# Patient Record
Sex: FEMALE | Race: WHITE | Employment: STUDENT | URBAN - METROPOLITAN AREA
[De-identification: names, ages, dates, MRNs, and addresses within clinical notes are randomized per-mention and may not be internally consistent; named-entity substitution may affect disease eponyms.]

---
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
## 2021-09-03 ENCOUNTER — APPOINTMENT (OUTPATIENT)
Dept: GENERAL RADIOLOGY | Age: 21
End: 2021-09-03
Attending: EMERGENCY MEDICINE
Payer: COMMERCIAL

## 2021-09-03 ENCOUNTER — HOSPITAL ENCOUNTER (EMERGENCY)
Age: 21
Discharge: HOME | End: 2021-09-03
Attending: EMERGENCY MEDICINE
Payer: COMMERCIAL

## 2021-09-03 VITALS
SYSTOLIC BLOOD PRESSURE: 125 MMHG | HEART RATE: 90 BPM | DIASTOLIC BLOOD PRESSURE: 86 MMHG | TEMPERATURE: 98 F | RESPIRATION RATE: 17 BRPM | OXYGEN SATURATION: 98 %

## 2021-09-03 DIAGNOSIS — M54.9 MUSCULOSKELETAL BACK PAIN: ICD-10-CM
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
## 2021-09-03 DIAGNOSIS — S81.811S LEG LACERATION, RIGHT, SEQUELA: ICD-10-CM

## 2021-09-03 PROCEDURE — 74011000250 HC RX REV CODE- 250: Performed by: EMERGENCY MEDICINE

## 2021-09-03 PROCEDURE — 99283 EMERGENCY DEPT VISIT LOW MDM: CPT

## 2021-09-03 PROCEDURE — 71100 X-RAY EXAM RIBS UNI 2 VIEWS: CPT

## 2021-09-03 PROCEDURE — 74011250637 HC RX REV CODE- 250/637: Performed by: EMERGENCY MEDICINE

## 2021-09-03 PROCEDURE — 71046 X-RAY EXAM CHEST 2 VIEWS: CPT

## 2021-09-03 RX ORDER — ACETAMINOPHEN 500 MG
1000 TABLET ORAL ONCE
Status: DISCONTINUED | OUTPATIENT
Start: 2021-09-03 | End: 2021-09-03

## 2021-09-03 RX ORDER — IBUPROFEN 600 MG/1
600 TABLET ORAL
Status: COMPLETED | OUTPATIENT
Start: 2021-09-03 | End: 2021-09-03

## 2021-09-03 RX ORDER — BACITRACIN 500 UNIT/G
1 PACKET (EA) TOPICAL
Status: COMPLETED | OUTPATIENT
Start: 2021-09-03 | End: 2021-09-03

## 2021-09-03 RX ADMIN — IBUPROFEN 600 MG: 600 TABLET, FILM COATED ORAL at 19:10

## 2021-09-03 RX ADMIN — BACITRACIN 1 PACKET: 500 OINTMENT TOPICAL at 19:43
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
## 2021-09-17 ENCOUNTER — OFFICE VISIT (OUTPATIENT)
Dept: INTERNAL MEDICINE CLINIC | Age: 21
End: 2021-09-17
Payer: COMMERCIAL

## 2021-09-17 ENCOUNTER — TELEPHONE (OUTPATIENT)
Dept: INTERNAL MEDICINE CLINIC | Age: 21
End: 2021-09-17

## 2021-09-17 VITALS
WEIGHT: 122.25 LBS | BODY MASS INDEX: 22.5 KG/M2 | DIASTOLIC BLOOD PRESSURE: 63 MMHG | RESPIRATION RATE: 20 BRPM | SYSTOLIC BLOOD PRESSURE: 97 MMHG | HEART RATE: 80 BPM | OXYGEN SATURATION: 98 % | HEIGHT: 62 IN | TEMPERATURE: 98.3 F

## 2021-09-17 DIAGNOSIS — T14.8XXA WOUND INFECTION, POSTTRAUMATIC: ICD-10-CM

## 2021-09-17 DIAGNOSIS — D27.0 TERATOMA OF OVARY, RIGHT: ICD-10-CM

## 2021-09-17 DIAGNOSIS — S89.91XS RIGHT LEG INJURY, SEQUELA: Primary | ICD-10-CM

## 2021-09-17 DIAGNOSIS — L08.9 WOUND INFECTION, POSTTRAUMATIC: ICD-10-CM

## 2021-09-17 PROCEDURE — 99214 OFFICE O/P EST MOD 30 MIN: CPT | Performed by: INTERNAL MEDICINE

## 2021-09-17 RX ORDER — SILVER SULFADIAZINE 10 G/1000G
CREAM TOPICAL
COMMUNITY
Start: 2021-09-10 | End: 2021-09-29 | Stop reason: SDUPTHER

## 2021-09-17 RX ORDER — SULFAMETHOXAZOLE AND TRIMETHOPRIM 800; 160 MG/1; MG/1
1 TABLET ORAL 2 TIMES DAILY
Qty: 20 TABLET | Refills: 0 | Status: SHIPPED | OUTPATIENT
Start: 2021-09-17 | End: 2021-09-27

## 2021-09-17 RX ORDER — OXYCODONE AND ACETAMINOPHEN 5; 325 MG/1; MG/1
TABLET ORAL
COMMUNITY
Start: 2021-09-08 | End: 2021-11-12

## 2021-09-17 RX ORDER — CEPHALEXIN 500 MG/1
500 CAPSULE ORAL 3 TIMES DAILY
COMMUNITY
Start: 2021-09-08 | End: 2021-09-17 | Stop reason: ALTCHOICE

## 2021-09-17 NOTE — TELEPHONE ENCOUNTER
Please call wound care center at [facility] and get pt an appt for next week.
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
## 2021-09-17 NOTE — LETTER
NOTIFICATION RETURN TO  SCHOOL    9/17/2021 12:32 PM    Ms. [address] [address]      To Whom It May Concern:    Nuha Angel is currently under the care of [facility]. She will return to school on: 9/27/21    If there are questions or concerns please have the patient contact our office.         Sincerely,      Pavan Santana MD

## 2021-09-17 NOTE — TELEPHONE ENCOUNTER
Called  wound care center at [facility] [phone], went to . Requested a call back to schedule a pt on behalf of Dr. Judy Mcdaniel.

## 2021-09-18 ENCOUNTER — HOSPITAL ENCOUNTER (EMERGENCY)
Age: 21
Discharge: HOME | End: 2021-09-18
Attending: EMERGENCY MEDICINE
Payer: COMMERCIAL

## 2021-09-18 ENCOUNTER — APPOINTMENT (OUTPATIENT)
Dept: ULTRASOUND IMAGING | Age: 21
End: 2021-09-18
Attending: EMERGENCY MEDICINE
Payer: COMMERCIAL

## 2021-09-18 VITALS
TEMPERATURE: 98.2 F | OXYGEN SATURATION: 96 % | BODY MASS INDEX: 22.45 KG/M2 | SYSTOLIC BLOOD PRESSURE: 102 MMHG | HEART RATE: 91 BPM | DIASTOLIC BLOOD PRESSURE: 69 MMHG | RESPIRATION RATE: 15 BRPM | WEIGHT: 122 LBS

## 2021-09-18 DIAGNOSIS — S81.801A WOUND OF RIGHT LOWER EXTREMITY, INITIAL ENCOUNTER: Primary | ICD-10-CM

## 2021-09-18 LAB
ANION GAP SERPL CALC-SCNC: 11 MMOL/L (ref 5–15)
BASOPHILS # BLD: 0 K/UL (ref 0–0.1)
BASOPHILS NFR BLD: 0 % (ref 0–1)
BUN SERPL-MCNC: 14 MG/DL (ref 6–20)
BUN/CREAT SERPL: 28 (ref 12–20)
CALCIUM SERPL-MCNC: 9.1 MG/DL (ref 8.5–10.1)
CHLORIDE SERPL-SCNC: 103 MMOL/L (ref 97–108)
CO2 SERPL-SCNC: 28 MMOL/L (ref 21–32)
CREAT SERPL-MCNC: 0.5 MG/DL (ref 0.55–1.02)
CRP SERPL-MCNC: 2.19 MG/DL
DIFFERENTIAL METHOD BLD: NORMAL
EOSINOPHIL # BLD: 0.1 K/UL (ref 0–0.4)
EOSINOPHIL NFR BLD: 1 % (ref 0–7)
ERYTHROCYTE [DISTWIDTH] IN BLOOD BY AUTOMATED COUNT: 13.2 % (ref 11.5–14.5)
ERYTHROCYTE [SEDIMENTATION RATE] IN BLOOD: 40 MM/HR (ref 0–20)
GLUCOSE SERPL-MCNC: 113 MG/DL (ref 65–100)
HCT VFR BLD AUTO: 39 % (ref 35–47)
HGB BLD-MCNC: 12.7 G/DL (ref 11.5–16)
IMM GRANULOCYTES # BLD AUTO: 0 K/UL (ref 0–0.04)
IMM GRANULOCYTES NFR BLD AUTO: 0 % (ref 0–0.5)
LYMPHOCYTES # BLD: 1.6 K/UL (ref 0.8–3.5)
LYMPHOCYTES NFR BLD: 22 % (ref 12–49)
MCH RBC QN AUTO: 29.4 PG (ref 26–34)
MCHC RBC AUTO-ENTMCNC: 32.6 G/DL (ref 30–36.5)
MCV RBC AUTO: 90.3 FL (ref 80–99)
MONOCYTES # BLD: 0.4 K/UL (ref 0–1)
MONOCYTES NFR BLD: 6 % (ref 5–13)
NEUTS SEG # BLD: 5.1 K/UL (ref 1.8–8)
NEUTS SEG NFR BLD: 71 % (ref 32–75)
NRBC # BLD: 0 K/UL (ref 0–0.01)
NRBC BLD-RTO: 0 PER 100 WBC
PLATELET # BLD AUTO: 368 K/UL (ref 150–400)
PMV BLD AUTO: 10.1 FL (ref 8.9–12.9)
POTASSIUM SERPL-SCNC: 4.6 MMOL/L (ref 3.5–5.1)
RBC # BLD AUTO: 4.32 M/UL (ref 3.8–5.2)
SODIUM SERPL-SCNC: 142 MMOL/L (ref 136–145)
WBC # BLD AUTO: 7.3 K/UL (ref 3.6–11)

## 2021-09-18 PROCEDURE — 99283 EMERGENCY DEPT VISIT LOW MDM: CPT

## 2021-09-18 PROCEDURE — 93971 EXTREMITY STUDY: CPT

## 2021-09-18 PROCEDURE — 85025 COMPLETE CBC W/AUTO DIFF WBC: CPT

## 2021-09-18 PROCEDURE — 86140 C-REACTIVE PROTEIN: CPT

## 2021-09-18 PROCEDURE — 85652 RBC SED RATE AUTOMATED: CPT

## 2021-09-18 PROCEDURE — 36415 COLL VENOUS BLD VENIPUNCTURE: CPT

## 2021-09-18 PROCEDURE — 80048 BASIC METABOLIC PNL TOTAL CA: CPT

## 2021-09-18 RX ORDER — IBUPROFEN 600 MG/1
600 TABLET ORAL
Qty: 20 TABLET | Refills: 0 | Status: SHIPPED | OUTPATIENT
Start: 2021-09-18 | End: 2022-08-10

## 2021-09-18 RX ORDER — CLINDAMYCIN HYDROCHLORIDE 150 MG/1
450 CAPSULE ORAL EVERY 6 HOURS
Qty: 84 CAPSULE | Refills: 0 | Status: SHIPPED | OUTPATIENT
Start: 2021-09-18 | End: 2021-09-25

## 2021-09-18 NOTE — DISCHARGE INSTRUCTIONS
Continue the Bactrim that was prescribed to you. If your symptoms worsen or the redness and drainage increase fill the prescription for clindamycin. Follow-up closely with the wound clinic and your orthopedic doctor. Return to the emergency department for any fevers, vomiting, increased pain, increased drainage or redness.
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
## 2021-09-18 NOTE — ED NOTES
Dr. Kina Pettit reviewed discharge instructions with the patient. The patient verbalized understanding. Patient ambulated out of the emergency department escorted by father. Patient's wound was dressed with non-stick dressing. Patient is in no apparent distress.

## 2021-09-20 NOTE — TELEPHONE ENCOUNTER
Pt has upcoming apt with Dr. Herminio Aaln for wound on 9/22/21.      Future Appointments   Date Time Provider [facility]   9/22/2021  2:00 PM Celine Uriostegui  [facility]   10/14/2021  2:20 PM Malina Jha MD [facility]

## 2021-09-22 ENCOUNTER — HOSPITAL ENCOUNTER (OUTPATIENT)
Dept: WOUND CARE | Age: 21
Discharge: HOME | End: 2021-09-22
Payer: COMMERCIAL

## 2021-09-22 VITALS
DIASTOLIC BLOOD PRESSURE: 66 MMHG | RESPIRATION RATE: 16 BRPM | SYSTOLIC BLOOD PRESSURE: 110 MMHG | TEMPERATURE: 98 F | HEART RATE: 84 BPM

## 2021-09-22 DIAGNOSIS — S81.801A TRAUMATIC OPEN WOUND OF RIGHT LOWER LEG, INITIAL ENCOUNTER: ICD-10-CM

## 2021-09-22 PROCEDURE — 99213 OFFICE O/P EST LOW 20 MIN: CPT

## 2021-09-22 PROCEDURE — 99203 OFFICE O/P NEW LOW 30 MIN: CPT | Performed by: SURGERY
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
## 2021-09-22 NOTE — WOUND CARE
09/22/21 1425   RLE Peripheral Vascular    Capillary Refill Less than/equal to 3 seconds   Color Appropriate for race   Temperature Warm   Sensation Present   Pedal Pulse Palpable   Wound Leg lower Anterior;Right #1 09/22/21   Date First Assessed/Time First Assessed: 09/22/21 1423   Present on Hospital Admission: Yes  Wound Approximate Age at First Assessment (Weeks): 3 weeks  Primary Wound Type: Traumatic  Location: Leg lower  Wound Location Orientation: Anterior;Right  Wo. ..    Wound Image    Wound Etiology Traumatic   Dressing Status Old drainage noted   Cleansed Cleansed with saline   Dressing/Treatment   (Silvadene, gauze, RG)   Wound Length (cm) 10.8 cm   Wound Width (cm) 12 cm   Wound Depth (cm) 0.2 cm   Wound Surface Area (cm^2) 129.6 cm^2   Wound Volume (cm^3) 25.92 cm^3   Wound Assessment Eschar dry   Drainage Amount Moderate   Drainage Description Serous   Wound Odor None   Amelie-Wound/Incision Assessment Blanchable erythema   Edges Defined edges   Wound Thickness Description Full thickness     Visit Vitals  /66 (BP 1 Location: Left upper arm, BP Patient Position: At rest;Sitting)   Pulse 84   Temp 98 °F (36.7 °C)   Resp 16   LMP 09/01/2021 (Approximate)

## 2021-09-29 ENCOUNTER — HOSPITAL ENCOUNTER (OUTPATIENT)
Dept: WOUND CARE | Age: 21
Discharge: HOME | End: 2021-09-29
Payer: COMMERCIAL

## 2021-09-29 VITALS
HEART RATE: 75 BPM | RESPIRATION RATE: 16 BRPM | TEMPERATURE: 98.3 F | SYSTOLIC BLOOD PRESSURE: 111 MMHG | DIASTOLIC BLOOD PRESSURE: 60 MMHG

## 2021-09-29 DIAGNOSIS — S81.801D TRAUMATIC OPEN WOUND OF RIGHT LOWER LEG, SUBSEQUENT ENCOUNTER: ICD-10-CM

## 2021-09-29 PROCEDURE — 99213 OFFICE O/P EST LOW 20 MIN: CPT

## 2021-09-29 PROCEDURE — 99213 OFFICE O/P EST LOW 20 MIN: CPT | Performed by: SURGERY

## 2021-09-29 RX ORDER — SILVER SULFADIAZINE 10 G/1000G
CREAM TOPICAL
Qty: 50 G | Refills: 1 | Status: SHIPPED | OUTPATIENT
Start: 2021-09-29 | End: 2022-08-10

## 2021-09-29 NOTE — DISCHARGE INSTRUCTIONS
Discharge Instructions for  [facility]  [address]  [facility], [address] [facility] [place], [id number]  Telephone: [phone]    NAME:  Jesus Salvage OF BIRTH:  2000  MEDICAL RECORD NUMBER:  [id number]  DATE:  9/29/2021  WOUND CARE ORDERS:  Right lower leg wound - cleanse with saline, pat dry, apply Silvadene cream, cover with gauze, then ABD, secure with roll gauze and tape. Apply single layer tubigrip size F from toes to just below the knee. Change dressing daily. Follow-up in 2 week. Patient may follow-up with General Surgeon once she returns to [facility]. TREATMENT ORDERS:    Elevate leg(s) above the level of the heart when sitting. Avoid prolonged standing in one place. Do no get dressing/wrap wet. Follow Diet as prescribed:   [x] Diet as tolerated: [] Calorie Diabetic Diet: Low carb and no Sugar [] No Added Salt:  [x] Increase Protein: [] Limit the amount of liquid you are drinking and avoid drinking in between meals           Return Appointment:  [x] Return Appointment: With DR Lary Oliver  in  [facility])  [] Nurse Visit : *** days  [] Ordered tests:    Electronically signed Rain Oliveira RN on 9/29/2021 at 2:55 PM     [address] [facility] Information: Should you experience any significant changes in your wound(s) or have questions about your wound care, please contact the [facility] at [address] [place] [address] 8:00 am - 4:30. If you need help with your wound outside these hours and cannot wait until we are again available, contact your PCP or go to the hospital emergency room. PLEASE NOTE: IF YOU ARE UNABLE TO OBTAIN WOUND SUPPLIES, CONTINUE TO USE THE SUPPLIES YOU HAVE AVAILABLE UNTIL YOU ARE ABLE TO REACH US. IT IS MOST IMPORTANT TO KEEP THE WOUND COVERED AT ALL TIMES.      Physician Signature:_______________________    Date: ___________ Time:  ____________

## 2021-09-29 NOTE — WOUND CARE
09/29/21 1402   Right Leg Edema Point of Measurement   Leg circumference 34.6 cm   Ankle circumference 21.4 cm   RLE Peripheral Vascular    Capillary Refill Less than/equal to 3 seconds   Color Appropriate for race   Temperature Warm   Sensation Present   Wound Leg lower Anterior;Right #1 09/22/21   Date First Assessed/Time First Assessed: 09/22/21 1423   Present on Hospital Admission: Yes  Wound Approximate Age at First Assessment (Weeks): 3 weeks  Primary Wound Type: Traumatic  Location: Leg lower  Wound Location Orientation: Anterior;Right  Wo. ..    Wound Image    Wound Etiology Traumatic   Dressing Status Intact   Cleansed Cleansed with saline   Dressing/Treatment Gauze dressing/dressing sponge;Roll gauze   Wound Length (cm) 10 cm   Wound Width (cm) 12 cm   Wound Depth (cm) 0.2 cm   Wound Surface Area (cm^2) 120 cm^2   Change in Wound Size % 7.41   Wound Volume (cm^3) 24 cm^3   Wound Healing % 7   Wound Assessment Eschar dry;Slough   Drainage Amount Moderate   Drainage Description Serous   Wound Odor None   Amelie-Wound/Incision Assessment Blanchable erythema   Edges Defined edges   Wound Thickness Description Full thickness   Pain 1   Pain Scale 1 Numeric (0 - 10)   Pain Intensity 1 0       Visit Vitals  /60   Pulse 75   Temp 98.3 °F (36.8 °C)   Resp 16   LMP 09/01/2021 (Approximate)
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
## 2021-10-14 ENCOUNTER — DOCUMENTATION ONLY (OUTPATIENT)
Dept: SURGERY | Age: 21
End: 2021-10-14

## 2021-10-14 NOTE — PROGRESS NOTES
Wound Care    At patient's request, I spoke to staff at [facility]) regarding referral of patient for wound care. She is in college locally at [employer]. I have sent demographics, insurance info, and  a copy of last clinic note to their fax. They will review and let us know if they can accept her.     Ene Blanchard MD

## 2021-10-25 ENCOUNTER — TELEPHONE (OUTPATIENT)
Dept: INTERNAL MEDICINE CLINIC | Age: 21
End: 2021-10-25

## 2021-10-25 NOTE — TELEPHONE ENCOUNTER
LVM for pt advising that a copy of her referral to OBGYN will be printed and placed at the  for . Requested call back if she has any other questions.

## 2021-10-25 NOTE — TELEPHONE ENCOUNTER
----- Message from Anjelica Nelson sent at 10/25/2021  2:21 PM EDT -----  Subject: Message to Provider    QUESTIONS  Information for Provider? Pt is needing an in hand referral to see an   OBGYN she has an appt on 10/29/2021 she would like to [facility] when she could   pick that up  ---------------------------------------------------------------------------  --------------  [address]  What is the best way for the office to contact you? OK to leave message on   voicemail  Preferred Call Back Phone Number? [phone]  ---------------------------------------------------------------------------  --------------  SCRIPT ANSWERS  Relationship to Patient?  Self

## 2021-11-12 ENCOUNTER — OFFICE VISIT (OUTPATIENT)
Dept: OBGYN CLINIC | Age: 21
End: 2021-11-12
Payer: COMMERCIAL

## 2021-11-12 VITALS
SYSTOLIC BLOOD PRESSURE: 102 MMHG | HEIGHT: 63 IN | WEIGHT: 124 LBS | BODY MASS INDEX: 21.97 KG/M2 | DIASTOLIC BLOOD PRESSURE: 66 MMHG

## 2021-11-12 DIAGNOSIS — N83.201 CYST OF RIGHT OVARY: Primary | ICD-10-CM

## 2021-11-12 PROCEDURE — 99213 OFFICE O/P EST LOW 20 MIN: CPT | Performed by: ADVANCED PRACTICE MIDWIFE
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
## 2021-11-15 ENCOUNTER — OFFICE VISIT (OUTPATIENT)
Dept: OBGYN CLINIC | Age: 21
End: 2021-11-15

## 2021-11-15 DIAGNOSIS — N83.201 CYST OF RIGHT OVARY: Primary | ICD-10-CM

## 2021-11-15 LAB
CANCER AG125 SERPL-ACNC: 11 U/ML (ref 1.5–35)
HCG SERPL-ACNC: <1 MIU/ML (ref 0–6)
LDH SERPL L TO P-CCNC: 163 U/L (ref 81–246)

## 2021-11-15 PROCEDURE — 99203 OFFICE O/P NEW LOW 30 MIN: CPT | Performed by: OBSTETRICS & GYNECOLOGY
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
## 2021-11-15 NOTE — PATIENT INSTRUCTIONS
Abnormal Uterine Bleeding: Care Instructions  Your Care Instructions     Abnormal uterine bleeding is irregular bleeding from the uterus that is longer or heavier than usual or does not occur at your regular time. Sometimes it is caused by changes in hormone levels. It can also be caused by growths in the uterus, such as fibroids or polyps. Sometimes a cause cannot be found. You may have heavy bleeding when you are not expecting your period. Your doctor may suggest a pregnancy test, if you think you are pregnant. Follow-up care is a key part of your treatment and safety. Be sure to make and go to all appointments, and call your doctor if you are having problems. It's also a good idea to know your test results and keep a list of the medicines you take. How can you care for yourself at home? · Be safe with medicines. Take pain medicines exactly as directed. ? If the doctor gave you a prescription medicine for pain, take it as prescribed. ? If you are not taking a prescription pain medicine, ask your doctor if you can take an over-the-counter medicine. · You may be low in iron because of blood loss. Eat a balanced diet that is high in iron and vitamin C. Foods rich in iron include red meat, shellfish, eggs, beans, and leafy green vegetables. Talk to your doctor about whether you need to take iron pills or a multivitamin. When should you call for help? Call 911 anytime you think you may need emergency care. For example, call if:    · You passed out (lost consciousness). Call your doctor now or seek immediate medical care if:    · You have new or worse belly or pelvic pain.     · You have severe vaginal bleeding.     · You feel dizzy or lightheaded, or you feel like you may faint. Watch closely for changes in your health, and be sure to contact your doctor if:    · You think you may be pregnant.     · Your bleeding gets worse.     · You do not get better as expected. Where can you learn more?   Go to http://www.gray.com/  Enter [id number] in the search box to learn more about \"Abnormal Uterine Bleeding: Care Instructions. \"  Current as of: February 11, 2021               Content Version: 13.0  © 5755-7377 [employer], Incorporated. Care instructions adapted under license by [employer] (which disclaims liability or warranty for this information). If you have questions about a medical condition or this instruction, always ask your healthcare professional. [address] any warranty or liability for your use of this information.

## 2021-11-16 LAB
AFP-TM SERPL-MCNC: 1.3 NG/ML (ref 0–8.3)
ESTRADIOL SERPL-MCNC: 21.4 PG/ML
HE4 SERPL-SCNC: 36.6 PMOL/L (ref 0–61.2)

## 2021-11-16 NOTE — PROGRESS NOTES
So far tumor markers all normal including Estradiol, AFP, HCG, LD, Ca125, some results still pending

## 2021-11-18 LAB
INHIBIN A SERPL-MCNC: 5.1 PG/ML
INHIBIN B SERPL-MCNC: 58.4 PG/ML

## 2021-11-22 ENCOUNTER — TELEPHONE (OUTPATIENT)
Dept: OBGYN CLINIC | Age: 21
End: 2021-11-22

## 2021-11-22 NOTE — TELEPHONE ENCOUNTER
MD Gen Randall, Nahum Ceron, RADHA  Cc: Victoria Singh  Caller: Unspecified (Today, 12:56 PM)  Gita and I have been messaging about this, sounds like most likely we should be able to do it on 12/6. Gita, if you can please fill in on details.  Thanks         Patient has been called by So

## 2021-11-22 NOTE — TELEPHONE ENCOUNTER
Call received at 1555PM    22year old patient last seen in the office on 11/15/2021    Patient calling about having surgery in December due to being out of school    Patient has upcoming appointment on 11/30/2021    Patient wondering about the urgency of the surgery and whether she can wait till this summer if it cant be done in December    Please advise

## 2021-11-23 ENCOUNTER — TRANSCRIBE ORDER (OUTPATIENT)
Dept: REGISTRATION | Age: 21
End: 2021-11-23

## 2021-11-23 DIAGNOSIS — Z01.812 PRE-PROCEDURE LAB EXAM: Primary | ICD-10-CM

## 2021-11-23 DIAGNOSIS — N83.201 CYST OF RIGHT OVARY: Primary | ICD-10-CM

## 2021-11-30 ENCOUNTER — OFFICE VISIT (OUTPATIENT)
Dept: OBGYN CLINIC | Age: 21
End: 2021-11-30
Payer: COMMERCIAL

## 2021-11-30 VITALS
DIASTOLIC BLOOD PRESSURE: 66 MMHG | SYSTOLIC BLOOD PRESSURE: 108 MMHG | BODY MASS INDEX: 22.32 KG/M2 | WEIGHT: 126 LBS | HEIGHT: 63 IN

## 2021-11-30 DIAGNOSIS — N83.209 CYST OF OVARY, UNSPECIFIED LATERALITY: Primary | ICD-10-CM

## 2021-11-30 PROCEDURE — 99213 OFFICE O/P EST LOW 20 MIN: CPT | Performed by: OBSTETRICS & GYNECOLOGY
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
## 2021-12-01 ENCOUNTER — HOSPITAL ENCOUNTER (OUTPATIENT)
Dept: PREADMISSION TESTING | Age: 21
Discharge: HOME | End: 2021-12-01
Payer: COMMERCIAL

## 2021-12-01 DIAGNOSIS — Z01.812 PRE-PROCEDURE LAB EXAM: ICD-10-CM

## 2021-12-01 PROCEDURE — U0005 INFEC AGEN DETEC AMPLI PROBE: HCPCS

## 2021-12-02 ENCOUNTER — TELEPHONE (OUTPATIENT)
Dept: OBGYN CLINIC | Age: 21
End: 2021-12-02

## 2021-12-02 LAB
SARS-COV-2, XPLCVT: NOT DETECTED
SOURCE, COVRS: NORMAL
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
## 2021-12-02 NOTE — TELEPHONE ENCOUNTER
LMTCB: insurance called regarding pt's upcoming outpatient surgery on 2021. The insurance company has her  listed differently. The insurance company is requesting the patient please call them to correct her . The insurance number is [id number].

## 2021-12-03 ENCOUNTER — ANESTHESIA EVENT (OUTPATIENT)
Dept: SURGERY | Age: 21
End: 2021-12-03
Payer: COMMERCIAL

## 2021-12-06 ENCOUNTER — HOSPITAL ENCOUNTER (OUTPATIENT)
Age: 21
Setting detail: OUTPATIENT SURGERY
Discharge: HOME | End: 2021-12-06
Attending: OBSTETRICS & GYNECOLOGY | Admitting: OBSTETRICS & GYNECOLOGY
Payer: COMMERCIAL

## 2021-12-06 ENCOUNTER — ANESTHESIA (OUTPATIENT)
Dept: SURGERY | Age: 21
End: 2021-12-06
Payer: COMMERCIAL

## 2021-12-06 VITALS
RESPIRATION RATE: 16 BRPM | BODY MASS INDEX: 22.15 KG/M2 | OXYGEN SATURATION: 99 % | HEIGHT: 63 IN | DIASTOLIC BLOOD PRESSURE: 60 MMHG | SYSTOLIC BLOOD PRESSURE: 108 MMHG | WEIGHT: 125 LBS | HEART RATE: 87 BPM | TEMPERATURE: 97.5 F

## 2021-12-06 DIAGNOSIS — N83.201 CYST OF RIGHT OVARY: ICD-10-CM

## 2021-12-06 DIAGNOSIS — Z98.890 S/P LAPAROSCOPY: Primary | ICD-10-CM

## 2021-12-06 LAB
HCG UR QL: NEGATIVE
HGB BLD-MCNC: 12.3 G/DL (ref 11.5–16)

## 2021-12-06 PROCEDURE — 77030008756 HC TU IRR SUC STRY -B: Performed by: OBSTETRICS & GYNECOLOGY

## 2021-12-06 PROCEDURE — 77030009851 HC PCH RTVR ENDOSC AMR -B: Performed by: OBSTETRICS & GYNECOLOGY

## 2021-12-06 PROCEDURE — 77030032230 HC DSCTR ULTSONC CRDLSS COVD -E: Performed by: OBSTETRICS & GYNECOLOGY

## 2021-12-06 PROCEDURE — 77030014090 HC TRCR OPT AMR -B: Performed by: OBSTETRICS & GYNECOLOGY

## 2021-12-06 PROCEDURE — 74011000250 HC RX REV CODE- 250: Performed by: OBSTETRICS & GYNECOLOGY

## 2021-12-06 PROCEDURE — 77030008684 HC TU ET CUF COVD -B: Performed by: ANESTHESIOLOGY

## 2021-12-06 PROCEDURE — 74011250636 HC RX REV CODE- 250/636: Performed by: ANESTHESIOLOGY

## 2021-12-06 PROCEDURE — 77030031139 HC SUT VCRL2 J&J -A: Performed by: OBSTETRICS & GYNECOLOGY

## 2021-12-06 PROCEDURE — 77030008606 HC TRCR ENDOSC KII AMR -B: Performed by: OBSTETRICS & GYNECOLOGY

## 2021-12-06 PROCEDURE — 74011250636 HC RX REV CODE- 250/636

## 2021-12-06 PROCEDURE — 77030003578 HC NDL INSUF VERES AMR -B: Performed by: OBSTETRICS & GYNECOLOGY

## 2021-12-06 PROCEDURE — 76010000132 HC OR TIME 2.5 TO 3 HR: Performed by: OBSTETRICS & GYNECOLOGY

## 2021-12-06 PROCEDURE — 58662 LAPAROSCOPY EXCISE LESIONS: CPT | Performed by: OBSTETRICS & GYNECOLOGY

## 2021-12-06 PROCEDURE — 77030040922 HC BLNKT HYPOTHRM STRY -A

## 2021-12-06 PROCEDURE — 74011000250 HC RX REV CODE- 250: Performed by: ANESTHESIOLOGY

## 2021-12-06 PROCEDURE — 85018 HEMOGLOBIN: CPT

## 2021-12-06 PROCEDURE — 77030002933 HC SUT MCRYL J&J -A: Performed by: OBSTETRICS & GYNECOLOGY

## 2021-12-06 PROCEDURE — 76060000036 HC ANESTHESIA 2.5 TO 3 HR: Performed by: OBSTETRICS & GYNECOLOGY

## 2021-12-06 PROCEDURE — 88307 TISSUE EXAM BY PATHOLOGIST: CPT

## 2021-12-06 PROCEDURE — 74011000258 HC RX REV CODE- 258: Performed by: NURSE ANESTHETIST, CERTIFIED REGISTERED

## 2021-12-06 PROCEDURE — 77030010031 HC SCIS ENDOSC MPLR J&J -C: Performed by: OBSTETRICS & GYNECOLOGY

## 2021-12-06 PROCEDURE — 74011000250 HC RX REV CODE- 250: Performed by: NURSE ANESTHETIST, CERTIFIED REGISTERED

## 2021-12-06 PROCEDURE — 76210000006 HC OR PH I REC 0.5 TO 1 HR: Performed by: OBSTETRICS & GYNECOLOGY

## 2021-12-06 PROCEDURE — 81025 URINE PREGNANCY TEST: CPT

## 2021-12-06 PROCEDURE — 77030026438 HC STYL ET INTUB CARD -A: Performed by: ANESTHESIOLOGY

## 2021-12-06 PROCEDURE — 76210000021 HC REC RM PH II 0.5 TO 1 HR: Performed by: OBSTETRICS & GYNECOLOGY

## 2021-12-06 PROCEDURE — 74011250637 HC RX REV CODE- 250/637

## 2021-12-06 PROCEDURE — 74011250637 HC RX REV CODE- 250/637: Performed by: ANESTHESIOLOGY

## 2021-12-06 PROCEDURE — 74011250636 HC RX REV CODE- 250/636: Performed by: NURSE ANESTHETIST, CERTIFIED REGISTERED

## 2021-12-06 PROCEDURE — 77030018684: Performed by: OBSTETRICS & GYNECOLOGY

## 2021-12-06 PROCEDURE — 74011250636 HC RX REV CODE- 250/636: Performed by: OBSTETRICS & GYNECOLOGY

## 2021-12-06 PROCEDURE — 2709999900 HC NON-CHARGEABLE SUPPLY: Performed by: OBSTETRICS & GYNECOLOGY

## 2021-12-06 PROCEDURE — 77030039895 HC SYST SMK EVAC LAP COVD -B: Performed by: OBSTETRICS & GYNECOLOGY

## 2021-12-06 RX ORDER — SODIUM CHLORIDE, SODIUM LACTATE, POTASSIUM CHLORIDE, CALCIUM CHLORIDE 600; 310; 30; 20 MG/100ML; MG/100ML; MG/100ML; MG/100ML
100 INJECTION, SOLUTION INTRAVENOUS CONTINUOUS
Status: DISCONTINUED | OUTPATIENT
Start: 2021-12-06 | End: 2021-12-06 | Stop reason: HOSPADM

## 2021-12-06 RX ORDER — ONDANSETRON 2 MG/ML
4 INJECTION INTRAMUSCULAR; INTRAVENOUS AS NEEDED
Status: DISCONTINUED | OUTPATIENT
Start: 2021-12-06 | End: 2021-12-06 | Stop reason: HOSPADM

## 2021-12-06 RX ORDER — PROPOFOL 10 MG/ML
INJECTION, EMULSION INTRAVENOUS AS NEEDED
Status: DISCONTINUED | OUTPATIENT
Start: 2021-12-06 | End: 2021-12-06 | Stop reason: HOSPADM

## 2021-12-06 RX ORDER — SUCCINYLCHOLINE CHLORIDE 20 MG/ML
INJECTION INTRAMUSCULAR; INTRAVENOUS AS NEEDED
Status: DISCONTINUED | OUTPATIENT
Start: 2021-12-06 | End: 2021-12-06 | Stop reason: HOSPADM

## 2021-12-06 RX ORDER — IBUPROFEN 800 MG/1
800 TABLET ORAL
Qty: 90 TABLET | Refills: 0 | Status: SHIPPED | OUTPATIENT
Start: 2021-12-06 | End: 2022-08-10

## 2021-12-06 RX ORDER — BUPIVACAINE HYDROCHLORIDE AND EPINEPHRINE 2.5; 5 MG/ML; UG/ML
INJECTION, SOLUTION EPIDURAL; INFILTRATION; INTRACAUDAL; PERINEURAL AS NEEDED
Status: DISCONTINUED | OUTPATIENT
Start: 2021-12-06 | End: 2021-12-06 | Stop reason: HOSPADM

## 2021-12-06 RX ORDER — KETOROLAC TROMETHAMINE 30 MG/ML
INJECTION, SOLUTION INTRAMUSCULAR; INTRAVENOUS AS NEEDED
Status: DISCONTINUED | OUTPATIENT
Start: 2021-12-06 | End: 2021-12-06 | Stop reason: HOSPADM

## 2021-12-06 RX ORDER — LIDOCAINE HYDROCHLORIDE 10 MG/ML
0.1 INJECTION, SOLUTION EPIDURAL; INFILTRATION; INTRACAUDAL; PERINEURAL AS NEEDED
Status: DISCONTINUED | OUTPATIENT
Start: 2021-12-06 | End: 2021-12-06 | Stop reason: HOSPADM

## 2021-12-06 RX ORDER — HYDROMORPHONE HYDROCHLORIDE 1 MG/ML
0.5 INJECTION, SOLUTION INTRAMUSCULAR; INTRAVENOUS; SUBCUTANEOUS
Status: DISCONTINUED | OUTPATIENT
Start: 2021-12-06 | End: 2021-12-06 | Stop reason: HOSPADM

## 2021-12-06 RX ORDER — ONDANSETRON 2 MG/ML
INJECTION INTRAMUSCULAR; INTRAVENOUS AS NEEDED
Status: DISCONTINUED | OUTPATIENT
Start: 2021-12-06 | End: 2021-12-06 | Stop reason: HOSPADM

## 2021-12-06 RX ORDER — MIDAZOLAM HYDROCHLORIDE 1 MG/ML
1 INJECTION, SOLUTION INTRAMUSCULAR; INTRAVENOUS AS NEEDED
Status: DISCONTINUED | OUTPATIENT
Start: 2021-12-06 | End: 2021-12-06 | Stop reason: HOSPADM

## 2021-12-06 RX ORDER — SODIUM CHLORIDE 0.9 % (FLUSH) 0.9 %
5-40 SYRINGE (ML) INJECTION EVERY 8 HOURS
Status: DISCONTINUED | OUTPATIENT
Start: 2021-12-06 | End: 2021-12-06 | Stop reason: HOSPADM

## 2021-12-06 RX ORDER — KETAMINE HYDROCHLORIDE 10 MG/ML
INJECTION, SOLUTION INTRAMUSCULAR; INTRAVENOUS AS NEEDED
Status: DISCONTINUED | OUTPATIENT
Start: 2021-12-06 | End: 2021-12-06 | Stop reason: HOSPADM

## 2021-12-06 RX ORDER — DIPHENHYDRAMINE HYDROCHLORIDE 50 MG/ML
12.5 INJECTION, SOLUTION INTRAMUSCULAR; INTRAVENOUS AS NEEDED
Status: DISCONTINUED | OUTPATIENT
Start: 2021-12-06 | End: 2021-12-06 | Stop reason: HOSPADM

## 2021-12-06 RX ORDER — MIDAZOLAM HYDROCHLORIDE 1 MG/ML
INJECTION, SOLUTION INTRAMUSCULAR; INTRAVENOUS AS NEEDED
Status: DISCONTINUED | OUTPATIENT
Start: 2021-12-06 | End: 2021-12-06 | Stop reason: HOSPADM

## 2021-12-06 RX ORDER — ROCURONIUM BROMIDE 10 MG/ML
INJECTION, SOLUTION INTRAVENOUS AS NEEDED
Status: DISCONTINUED | OUTPATIENT
Start: 2021-12-06 | End: 2021-12-06 | Stop reason: HOSPADM

## 2021-12-06 RX ORDER — MIDAZOLAM HYDROCHLORIDE 1 MG/ML
0.5 INJECTION, SOLUTION INTRAMUSCULAR; INTRAVENOUS
Status: DISCONTINUED | OUTPATIENT
Start: 2021-12-06 | End: 2021-12-06 | Stop reason: HOSPADM

## 2021-12-06 RX ORDER — ACETAMINOPHEN 325 MG/1
650 TABLET ORAL ONCE
Status: COMPLETED | OUTPATIENT
Start: 2021-12-06 | End: 2021-12-06

## 2021-12-06 RX ORDER — FENTANYL CITRATE 50 UG/ML
INJECTION, SOLUTION INTRAMUSCULAR; INTRAVENOUS AS NEEDED
Status: DISCONTINUED | OUTPATIENT
Start: 2021-12-06 | End: 2021-12-06 | Stop reason: HOSPADM

## 2021-12-06 RX ORDER — SODIUM CHLORIDE 0.9 % (FLUSH) 0.9 %
5-40 SYRINGE (ML) INJECTION AS NEEDED
Status: DISCONTINUED | OUTPATIENT
Start: 2021-12-06 | End: 2021-12-06 | Stop reason: HOSPADM

## 2021-12-06 RX ORDER — DEXAMETHASONE SODIUM PHOSPHATE 4 MG/ML
INJECTION, SOLUTION INTRA-ARTICULAR; INTRALESIONAL; INTRAMUSCULAR; INTRAVENOUS; SOFT TISSUE AS NEEDED
Status: DISCONTINUED | OUTPATIENT
Start: 2021-12-06 | End: 2021-12-06 | Stop reason: HOSPADM

## 2021-12-06 RX ORDER — FENTANYL CITRATE 50 UG/ML
50 INJECTION, SOLUTION INTRAMUSCULAR; INTRAVENOUS AS NEEDED
Status: DISCONTINUED | OUTPATIENT
Start: 2021-12-06 | End: 2021-12-06 | Stop reason: HOSPADM

## 2021-12-06 RX ORDER — HYDROCODONE BITARTRATE AND ACETAMINOPHEN 5; 325 MG/1; MG/1
1 TABLET ORAL
Qty: 8 TABLET | Refills: 0 | Status: SHIPPED | OUTPATIENT
Start: 2021-12-06 | End: 2021-12-09

## 2021-12-06 RX ORDER — OXYCODONE HYDROCHLORIDE 5 MG/1
TABLET ORAL
Status: COMPLETED
Start: 2021-12-06 | End: 2021-12-06

## 2021-12-06 RX ORDER — MORPHINE SULFATE 2 MG/ML
2 INJECTION, SOLUTION INTRAMUSCULAR; INTRAVENOUS
Status: DISCONTINUED | OUTPATIENT
Start: 2021-12-06 | End: 2021-12-06 | Stop reason: HOSPADM

## 2021-12-06 RX ORDER — LIDOCAINE HYDROCHLORIDE 20 MG/ML
INJECTION, SOLUTION EPIDURAL; INFILTRATION; INTRACAUDAL; PERINEURAL AS NEEDED
Status: DISCONTINUED | OUTPATIENT
Start: 2021-12-06 | End: 2021-12-06 | Stop reason: HOSPADM

## 2021-12-06 RX ORDER — FENTANYL CITRATE 50 UG/ML
25 INJECTION, SOLUTION INTRAMUSCULAR; INTRAVENOUS
Status: DISCONTINUED | OUTPATIENT
Start: 2021-12-06 | End: 2021-12-06 | Stop reason: HOSPADM

## 2021-12-06 RX ORDER — ROPIVACAINE HYDROCHLORIDE 5 MG/ML
30 INJECTION, SOLUTION EPIDURAL; INFILTRATION; PERINEURAL AS NEEDED
Status: DISCONTINUED | OUTPATIENT
Start: 2021-12-06 | End: 2021-12-06 | Stop reason: HOSPADM

## 2021-12-06 RX ORDER — GLYCOPYRROLATE 0.2 MG/ML
INJECTION INTRAMUSCULAR; INTRAVENOUS AS NEEDED
Status: DISCONTINUED | OUTPATIENT
Start: 2021-12-06 | End: 2021-12-06 | Stop reason: HOSPADM

## 2021-12-06 RX ORDER — OXYCODONE HYDROCHLORIDE 5 MG/1
5 TABLET ORAL ONCE
Status: COMPLETED | OUTPATIENT
Start: 2021-12-06 | End: 2021-12-06

## 2021-12-06 RX ORDER — NEOSTIGMINE METHYLSULFATE 1 MG/ML
INJECTION, SOLUTION INTRAVENOUS AS NEEDED
Status: DISCONTINUED | OUTPATIENT
Start: 2021-12-06 | End: 2021-12-06 | Stop reason: HOSPADM

## 2021-12-06 RX ORDER — SODIUM CHLORIDE 9 MG/ML
25 INJECTION, SOLUTION INTRAVENOUS CONTINUOUS
Status: DISCONTINUED | OUTPATIENT
Start: 2021-12-06 | End: 2021-12-06 | Stop reason: HOSPADM

## 2021-12-06 RX ADMIN — OXYCODONE HYDROCHLORIDE 5 MG: 5 TABLET ORAL at 10:56

## 2021-12-06 RX ADMIN — ROCURONIUM BROMIDE 10 MG: 10 SOLUTION INTRAVENOUS at 07:35

## 2021-12-06 RX ADMIN — ONDANSETRON HYDROCHLORIDE 4 MG: 2 SOLUTION INTRAMUSCULAR; INTRAVENOUS at 11:15

## 2021-12-06 RX ADMIN — KETAMINE HYDROCHLORIDE 25 MG: 10 INJECTION, SOLUTION INTRAMUSCULAR; INTRAVENOUS at 07:58

## 2021-12-06 RX ADMIN — MIDAZOLAM 2 MG: 1 INJECTION INTRAMUSCULAR; INTRAVENOUS at 07:27

## 2021-12-06 RX ADMIN — FENTANYL CITRATE 50 MCG: 50 INJECTION, SOLUTION INTRAMUSCULAR; INTRAVENOUS at 07:35

## 2021-12-06 RX ADMIN — ROCURONIUM BROMIDE 20 MG: 10 SOLUTION INTRAVENOUS at 07:49

## 2021-12-06 RX ADMIN — DEXAMETHASONE SODIUM PHOSPHATE 4 MG: 4 INJECTION, SOLUTION INTRAMUSCULAR; INTRAVENOUS at 07:54

## 2021-12-06 RX ADMIN — DEXMEDETOMIDINE HYDROCHLORIDE 8 MCG: 100 INJECTION, SOLUTION, CONCENTRATE INTRAVENOUS at 09:25

## 2021-12-06 RX ADMIN — FENTANYL CITRATE 50 MCG: 50 INJECTION, SOLUTION INTRAMUSCULAR; INTRAVENOUS at 09:41

## 2021-12-06 RX ADMIN — KETOROLAC TROMETHAMINE 30 MG: 30 INJECTION, SOLUTION INTRAMUSCULAR; INTRAVENOUS at 09:34

## 2021-12-06 RX ADMIN — SUCCINYLCHOLINE CHLORIDE 160 MG: 20 INJECTION, SOLUTION INTRAMUSCULAR; INTRAVENOUS at 07:36

## 2021-12-06 RX ADMIN — LIDOCAINE HYDROCHLORIDE 60 MG: 20 INJECTION, SOLUTION EPIDURAL; INFILTRATION; INTRACAUDAL; PERINEURAL at 07:35

## 2021-12-06 RX ADMIN — NEOSTIGMINE METHYLSULFATE 3 MG: 1 INJECTION, SOLUTION INTRAVENOUS at 09:20

## 2021-12-06 RX ADMIN — PROPOFOL 50 MG: 10 INJECTION, EMULSION INTRAVENOUS at 07:43

## 2021-12-06 RX ADMIN — WATER 2 G: 1 INJECTION INTRAMUSCULAR; INTRAVENOUS; SUBCUTANEOUS at 07:46

## 2021-12-06 RX ADMIN — OXYCODONE 5 MG: 5 TABLET ORAL at 10:56

## 2021-12-06 RX ADMIN — SODIUM CHLORIDE, POTASSIUM CHLORIDE, SODIUM LACTATE AND CALCIUM CHLORIDE 100 ML/HR: 600; 310; 30; 20 INJECTION, SOLUTION INTRAVENOUS at 06:44

## 2021-12-06 RX ADMIN — ROCURONIUM BROMIDE 10 MG: 10 SOLUTION INTRAVENOUS at 08:34

## 2021-12-06 RX ADMIN — GLYCOPYRROLATE 0.4 MG: 0.2 INJECTION, SOLUTION INTRAMUSCULAR; INTRAVENOUS at 09:20

## 2021-12-06 RX ADMIN — PROPOFOL 150 MG: 10 INJECTION, EMULSION INTRAVENOUS at 07:35

## 2021-12-06 RX ADMIN — ONDANSETRON HYDROCHLORIDE 4 MG: 2 INJECTION, SOLUTION INTRAMUSCULAR; INTRAVENOUS at 09:20

## 2021-12-06 RX ADMIN — ACETAMINOPHEN 650 MG: 325 TABLET ORAL at 06:51

## 2021-12-06 NOTE — DISCHARGE INSTRUCTIONS
POSTOPERATIVE INSTRUCTIONS  FOR LAPAROSCOPY, OVARIAN CYSTECTOMY    **No Ibuprofen/Motrin products until after 3:30pm today**  **Can have Norco at 5 pm    ______________________________________________________________________    Anesthesia Discharge Instructions    After general anesthesia or intervenous sedation, for 24 hours or while taking prescription Narcotics:  · Limit your activities  · Do not drive or operate hazardous machinery  · If you have not urinated within 8 hours after discharge, please contact your surgeon on call. · Do not make important personal or business decisions  · Do not drink alcoholic beverages    Report the following to your surgeon:  · Excessive pain, swelling, redness or odor of or around the surgical area  · Temperature over 100.5 degrees  · Nausea and vomiting lasting longer than 4 hours or if unable to take medication  · Any signs of decreased circulation or nerve impairment to extremity:  Change in color, persistent numbness, tingling, coldness or increased pain. · Any questions      Upon discharge from the hospital, there are a few things to consider:     1. If you had band-aid surgery, you might experience shoulder or abdominal pain        which is secondary to the gas used to inflate your abdomen. This should pass in        12-48 hours. 2. You may have some vaginal bleeding or bloody vaginal discharge. 3. You may take tub baths or showers. 4. No intercourse for two weeks. 5. You may resume normal activities as you feel able. 6. Notify us if you experience:     a.  heavy vaginal bleeding (soaking one pad per hour)    b.  incisional infection     c.  severe pelvic or abdominal pain    d.  severe nausea and vomiting      7. Use prescribed medication for pain. 8. Please call the office today at [phone] to schedule your checkup appointment in    4-6 weeks.         Above all, please notify us of a problem if it arises before we see you again. In an emergency, you may contact a doctor 24 hours a day at [phone].

## 2021-12-06 NOTE — DISCHARGE SUMMARY
Gynecology Discharge Summary     Patient ID:  Cheyenne Bryant  [id number]  22 y.o.  2000    Admit date: 12/6/2021    Discharge date and time: 12/6/21 mid-day    Admission Diagnoses:  RIGHT OVARIAN CYST, SUSPECTED DERMOID  Patient Active Problem List   Diagnosis Code    Traumatic open wound of right lower leg S81.801A       Discharge Diagnoses:  RIGHT OVARIAN CYST, SUSPECTED DERMOID  Patient Active Problem List   Diagnosis Code    Traumatic open wound of right lower leg S81.801A       Procedures for this admission: Procedure(s):  [facility] Course: Pt admitted for diagnostic laparoscopy, ROV cystectomy, procedure uncomplicated. 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 Pt stable for discharge home same day of procedure. Disposition: Home or self care    Discharged Condition: stable            Patient Instructions:   Current Discharge Medication List      START taking these medications    Details   !! ibuprofen (MOTRIN) 800 mg tablet Take 1 Tablet by mouth every eight (8) hours as needed for Pain. Qty: 90 Tablet, Refills: 0      HYDROcodone-acetaminophen (Norco) 5-325 mg per tablet Take 1 Tablet by mouth every six (6) hours as needed for Pain for up to 3 days. Max Daily Amount: 4 Tablets. Qty: 8 Tablet, Refills: 0    Associated Diagnoses: Cyst of right ovary; S/P laparoscopy       ! ! - Potential duplicate medications found. Please discuss with provider. CONTINUE these medications which have NOT CHANGED    Details   SSD 1 % topical cream APPY DAILY TO WOUND  Qty: 50 g, Refills: 1      !! ibuprofen (MOTRIN) 600 mg tablet Take 1 Tablet by mouth every six (6) hours as needed for Pain. Qty: 20 Tablet, Refills: 0       !! - Potential duplicate medications found. Please discuss with provider.         Activity: Activity as tolerated, no driving for 1 week, may ambulate in house, no heavy lifting or strenuous exercise for 6 weeks, no sex for 6 weeks, no driving while on analgesics  Diet: Regular Diet  Wound Care: Keep wound clean and dry    Follow-up with Dr. Vance Gonzalez in 4 weeks  Signed:  Celeste Ashton MD  12/6/2021  10:22 AM

## 2021-12-06 NOTE — OP NOTES
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 Thi Greene, the assistant surgeon, was present during the procedure. The physician's presence was necessary due to large size of cyst.The assistant surgeon performed significant portions of the surgery, including incising tissue, clamping, control of bleeding with suturing and cauterization, and decision making at challenging portions of the procedure. This assistance was necessary to accomplish the optimal outcome for the patient, above and beyond what a non-MD assistant could have provided. Anesthesia: GETA  Complications: none  EBL: <10cc  IVF: 800cc crystalloid (per anesthesia)  UOP: 100cc clear yellow urine at completion of case  Findings:  Large~9cm ROV cyst (containing sebaceous material, teeth, hair and solid components), uterus, bilateral tubes and ovaries otherwise normal appearing, bowel, liver, bladder and other intraabdominal tissues also normal appearing. Specimen: right ovarian cyst, suspected dermoid    Procedure: The patient was taken to the operating room, she was placed on the operating table in the supine position and was intubated using GETA. The patient was then placed in the lithotomy position with her legs in stirrups and arms tucked. Exam under anesthesia was performed with the above findings noted. She was prepped and draped in a sterile fashion and a time out was performed. A medina catheter was placed to drain the bladder immediately prior to start of procedure. Next, a speculum was placed in the vagina, the cervix was grasped with a single tooth tenaculum, and a hulka uterine manipulator was inserted without difficulty.      Attention was then turned abdominally, umbilicus was grasped with 2 towel clamps, 3cc of 0.25% marcaine was injected, and a 5mm incision was made at the base of the umbilicus. Next, the varess needle was placed, and the abdomen was insufflated to 15 mmHg. A 5mm visiport was placed at umbillicus without difficulty. A survey of intra-abdominal anatomy revealed findings noted above. Next, 2 additional 5mm ports were placed in bilateral lower quadrants under direct visualization (local anesthesia provided at these sites prior to port placement). Next, monopolar scissors and graspers were used to shell out right ovarian cyst. Small leakage of sebaceous fluid from cyst during the process, but otherwise cyst was excised in tact and no ovarian cortex was removed. After completion of cystectomy, LLQ 5mm port was removed and incision was extended to accomodate a 12 mm port which was placed without difficulty. Endocatch bag was then inserted and specimen was placed in bag and pulled out through LLQ port site. It was necessary to drain the cyst in the bag before specimen could be completely removed. At this time sebaceous material, hair, teeth and solid components were noted. Once specimen was removed and sent to pathology, abdomen was copiously irrigated. Right ovary was noted to be hemostatic. All ports were removed under visualization and were noted to be hemostatic. Gas was allowed to escape from the abdomen. Fascia of LLQ port site was closed with #0 vicryl in a running fashion. Skin of all port sites was closed with 4-0 monocryl and dermabond. At completion of procedure, all counts were correct x2. The patient was placed back in the supine position, was extubated, and taken to the recovery room in stable condition. There were no complications. Patient tolerated the procedure well.     Aman Gunter MD

## 2021-12-06 NOTE — BRIEF OP NOTE
Brief Postoperative Note    Patient: Mike Yu  YOB: 2000  MRN: [id number]    Date of Procedure: 12/6/2021     Pre-Op Diagnosis: RIGHT OVARIAN CYST, suspected dermoid    Post-Op Diagnosis: Same as preoperative diagnosis. Procedure(s):  DIAGNOSTIC LAPAROSCOPY RIGHT OVARIAN CYSTECTOMY    Surgeon(s):  MD Tylor Harden MD    Surgical Assistant: Surg Asst-1: Dulce Atkins    Anesthesia: General     Estimated Blood Loss (mL): Minimal    Complications: None    Specimens:   ID Type Source Tests Collected by Time Destination   1 : SUSPECTED DERMOID CYST Fresh Ovarian Cyst,Right  Jimbo Saleh MD 12/6/2021 0900 Pathology        Implants: * No implants in log *    Drains: * No LDAs found *    Findings: Large right ~9cm ROV cyst (containing sebaceous material, teeth, hair and solid components), uterus, bilateral tubes and ovaries otherwise normal appearing, bowel, liver, bladder and other intraabdominal tissues also normal appearing.     Electronically Signed by Laurie Pelayo MD on 12/6/2021 at 9:52 AM

## 2021-12-07 NOTE — ANESTHESIA POSTPROCEDURE EVALUATION
Procedure(s):  DIAGNOSTIC LAPAROSCOPY RIGHT OVARIAN CYSTECTOMY. general    Anesthesia Post Evaluation        Patient location during evaluation: PACU  Note status: Adequate. Level of consciousness: responsive to verbal stimuli and sleepy but conscious  Pain management: satisfactory to patient  Airway patency: patent  Anesthetic complications: no  Cardiovascular status: acceptable  Respiratory status: acceptable  Hydration status: acceptable  Comments: +Post-Anesthesia Evaluation and Assessment    Patient: Deysi Palm MRN: [id number]  SSN: [id number]   YOB: 2000  Age: 22 y.o. Sex: female          Cardiovascular Function/Vital Signs    /60 (BP 1 Location: Right upper arm, BP Patient Position: At rest)   Pulse 87   Temp 36.4 °C (97.5 °F)   Resp 16   Ht 5' 3\" (1.6 m)   Wt 56.7 kg (125 lb)   SpO2 99%   BMI 22.14 kg/m²     Patient is status post Procedure(s):  DIAGNOSTIC LAPAROSCOPY RIGHT OVARIAN CYSTECTOMY. Nausea/Vomiting: Controlled. Postoperative hydration reviewed and adequate. Pain:  Pain Scale 1: Numeric (0 - 10) (12/06/21 1057)  Pain Intensity 1: 4 (12/06/21 1057)   Managed. Neurological Status:   Neuro (WDL): Within Defined Limits (12/06/21 1030)   At baseline. Mental Status and Level of Consciousness: Arousable. Pulmonary Status:   O2 Device: None (Room air) (12/06/21 1030)   Adequate oxygenation and airway patent. Complications related to anesthesia: None    Post-anesthesia assessment completed. No concerns. I have evaluated the patient and the patient is stable and ready to be discharged from PACU . Signed By: Darell Massey MD    12/7/2021        INITIAL Post-op Vital signs:   Vitals Value Taken Time   /60 12/06/21 1030   Temp 36.4 °C (97.5 °F) 12/06/21 1030   Pulse 73 12/06/21 1040   Resp 12 12/06/21 1040   SpO2 99 % 12/06/21 1040   Vitals shown include unvalidated device data.

## 2021-12-09 NOTE — PROGRESS NOTES
Please inform patient surgical pathology was consistent with benign mature cystic teratoma (dermoid) as expected. Please inquire how she is doing post-op.

## 2022-01-11 ENCOUNTER — OFFICE VISIT (OUTPATIENT)
Dept: OBGYN CLINIC | Age: 22
End: 2022-01-11
Payer: COMMERCIAL

## 2022-01-11 VITALS
SYSTOLIC BLOOD PRESSURE: 108 MMHG | DIASTOLIC BLOOD PRESSURE: 66 MMHG | WEIGHT: 127 LBS | HEIGHT: 63 IN | BODY MASS INDEX: 22.5 KG/M2

## 2022-01-11 DIAGNOSIS — Z09 POSTOP CHECK: Primary | ICD-10-CM

## 2022-01-11 PROCEDURE — 99024 POSTOP FOLLOW-UP VISIT: CPT | Performed by: OBSTETRICS & GYNECOLOGY

## 2022-01-11 NOTE — PROGRESS NOTES
Postop Evaluation  Genie Ventura is a 22 y.o. female returns for a routine post-operative follow-up visit after undergoing the following: R Bayia Philip 73 which was done 5 weeks ago. Her pathology results revealed: Benign mature cystic teratoma. Since the patient's surgery, she has had typical postoperative discomfort but no significant symptoms or problems since the surgery. The patient's incision is healing well with no significant drainage. She states since the procedure, she has returned to full daily activities, ambulating, and not lifting or exercising. PHYSICAL EXAMINATION  Visit Vitals  /66   Ht 5' 3\" (1.6 m)   Wt 127 lb (57.6 kg)   LMP 12/12/2021   BMI 22.50 kg/m²       Gastrointestinal  · Abdominal Examination: abdomen non-tender to palpation, incision/s healing well, normal bowel sounds, no masses present, All laparoscopic incision sites healing well  · Liver and spleen: no hepatomegaly present, spleen not palpable  · Hernias: no hernias identified    Skin  · General Inspection: no rash, no lesions identified    Neurologic/Psychiatric  · Mental Status:  · Orientation: grossly oriented to person, place and time  · Mood and Affect: mood normal, affect appropriate    Assessment:  Normal postop checkup    Plan:  RTO as scheduled for AESuleman Cisneros MD  1/11/2022  11:06 AM

## 2022-03-19 PROBLEM — S81.801A TRAUMATIC OPEN WOUND OF RIGHT LOWER LEG: Status: ACTIVE | Noted: 2021-09-22

## 2022-08-10 ENCOUNTER — OFFICE VISIT (OUTPATIENT)
Dept: OBGYN CLINIC | Age: 22
End: 2022-08-10
Payer: COMMERCIAL

## 2022-08-10 VITALS
BODY MASS INDEX: 22.32 KG/M2 | WEIGHT: 126 LBS | DIASTOLIC BLOOD PRESSURE: 58 MMHG | SYSTOLIC BLOOD PRESSURE: 100 MMHG | HEIGHT: 63 IN

## 2022-08-10 DIAGNOSIS — Z11.3 SCREENING FOR VENEREAL DISEASE: ICD-10-CM

## 2022-08-10 DIAGNOSIS — Z01.419 WELL WOMAN EXAM: Primary | ICD-10-CM

## 2022-08-10 PROCEDURE — 99395 PREV VISIT EST AGE 18-39: CPT | Performed by: OBSTETRICS & GYNECOLOGY
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
## 2022-08-13 LAB
C TRACH RRNA CVX QL NAA+PROBE: NEGATIVE
CYTOLOGIST CVX/VAG CYTO: NORMAL
CYTOLOGY CVX/VAG DOC CYTO: NORMAL
CYTOLOGY CVX/VAG DOC THIN PREP: NORMAL
DX ICD CODE: NORMAL
LABCORP, 190119: NORMAL
Lab: NORMAL
N GONORRHOEA RRNA CVX QL NAA+PROBE: NEGATIVE
OTHER STN SPEC: NORMAL
STAT OF ADQ CVX/VAG CYTO-IMP: NORMAL
T VAGINALIS RRNA SPEC QL NAA+PROBE: NEGATIVE

## 2022-10-21 ENCOUNTER — TELEPHONE (OUTPATIENT)
Dept: OBGYN CLINIC | Age: 22
End: 2022-10-21

## 2022-10-21 NOTE — TELEPHONE ENCOUNTER
Pt calling name and  verified. Pt states she was here on 08/10/2022. She discussed birth control with you . She would like to start birth control pills she ws on before and it helped with her cramping during her cycle. She has history of ovarian cyst removal and she has cramps so bad she is nauseous. And has to stay in bed. She states her bleeding is normal  she is still in [place] causing her to miss classes.       Please advise Thank you

## 2022-10-24 NOTE — TELEPHONE ENCOUNTER
Called patient left message on verified voice mail per below    Barbara Mcgill MD  to Me    SP    5:14 PM  I think she needs to be evaluated with ultrasound. At her last visit I was concerned for recurrent adnexal mass on exam and recommended she have ultrasound. Since [place], please review pain precautions and please advise her to go to nearest provider for assessment if she is still in that much pain. If she is ruled out for any acute pathology, I think it would be reasonable to restart her birth control pills to regulate her menstrual cycles. Thanks.

## 2022-10-25 NOTE — TELEPHONE ENCOUNTER
Louis Mota MD  to Me    KP    1:35 PM  Given that Dr. Mikala Dunlap is out and it looks like the patient has previously disliked OCP side effects, would recommend alternating motrin 800mg and tylenol 650 mg and then Dr. Mikala Dunlap can discuss options at her follow up visit!      Thanks,   James Pono MD -      Patient advised of work in [address] [place] [address] and verbalized understanding and states she will wait to discuss oral contraceptive till she see Md at 11/22/2022 appointment

## 2022-10-25 NOTE — TELEPHONE ENCOUNTER
25year old patient calling back and has appointment for ultrasound and ov on 11/22/2022 and is wondering if she might have and oral birth control prescribed to help out with her period cramping so she does not have to miss classes      Pharmacy confirmed      SP patient    Patient verbalized understanding.
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
## 2022-11-22 ENCOUNTER — OFFICE VISIT (OUTPATIENT)
Dept: OBGYN CLINIC | Age: 22
End: 2022-11-22
Payer: COMMERCIAL

## 2022-11-22 VITALS — WEIGHT: 124 LBS | BODY MASS INDEX: 21.97 KG/M2 | DIASTOLIC BLOOD PRESSURE: 64 MMHG | SYSTOLIC BLOOD PRESSURE: 110 MMHG

## 2022-11-22 DIAGNOSIS — N83.209 CYST OF OVARY, UNSPECIFIED LATERALITY: Primary | ICD-10-CM

## 2022-11-22 PROCEDURE — 99213 OFFICE O/P EST LOW 20 MIN: CPT | Performed by: OBSTETRICS & GYNECOLOGY

## 2023-01-09 ENCOUNTER — TELEPHONE (OUTPATIENT)
Dept: OBGYN CLINIC | Age: 23
End: 2023-01-09

## 2023-01-09 NOTE — TELEPHONE ENCOUNTER
Pt calling name and  verifeid. Pt just seen in office 2022. Pt is asking to go on birth control pills at this time does not want iud at this time.        Please advise thank you

## 2023-01-10 RX ORDER — NORETHINDRONE ACETATE AND ETHINYL ESTRADIOL 1MG-20(21)
1 KIT ORAL DAILY
Qty: 3 DOSE PACK | Refills: 3 | Status: SHIPPED | OUTPATIENT
Start: 2023-01-10

## 2023-05-20 RX ORDER — NORETHINDRONE ACETATE AND ETHINYL ESTRADIOL 1MG-20(21)
1 KIT ORAL DAILY
COMMUNITY
Start: 2023-01-10

## 2023-11-14 ENCOUNTER — TELEPHONE (OUTPATIENT)
Age: 23
End: 2023-11-14

## 2023-11-14 RX ORDER — NORETHINDRONE ACETATE AND ETHINYL ESTRADIOL 1MG-20(21)
1 KIT ORAL DAILY
Qty: 3 PACKET | Refills: 0 | Status: SHIPPED | OUTPATIENT
Start: 2023-11-14

## 2023-11-14 NOTE — TELEPHONE ENCOUNTER
Pt calling requesting a refill on OCP d/t lost medication & pt reports her pharmacy advised her to have another rx sent in. Pt is self pay insurance at this time. Pt would like to schedule next wwe for mid 1/2024, pt scheduled for 1/8/24 at 2pm. Pt wondering if she needs another ultrasound to fu on ov cyst. Pt last seen 11/22/22 & pt was planning Deloise Saleem IUD but decided on staying on OCP. Pt reports being self pay at this time, but has obtained insurance that will be active on 12/1/23. Pt has Livestream, member ID [id number], from the [employer]. Consulted with Wayne Fletcher & learned that we do not accept marketplace insurance, or number starting with YTR, YTL, YTU, YTY, or YTZ. I advised I will fu with someone with a final answer on if we accept her insurance.

## 2023-11-17 NOTE — TELEPHONE ENCOUNTER
Called pt, advised OCP was sent to pharmacy, advised Dr. Samuel Palomino does want an u/s at [place] & scheduled it with pt 1/8/24 130pm u/s & 2pm SP, advised pt we are not contracted with [facility] GARY plans, asked pt if her card has Compass or Navigate listed on it, pt will double check & call us back. Pt informed on how to go to [facility] web site & request medical records if she needs to transfer. Advised pt to see if there is an bonnie for insurance to see what doctors are covered. No further questions.

## 2024-11-15 ENCOUNTER — OFFICE VISIT (OUTPATIENT)
Age: 24
End: 2024-11-15

## 2024-11-15 VITALS
SYSTOLIC BLOOD PRESSURE: 92 MMHG | OXYGEN SATURATION: 97 % | WEIGHT: 136 LBS | RESPIRATION RATE: 16 BRPM | HEART RATE: 71 BPM | BODY MASS INDEX: 24.1 KG/M2 | DIASTOLIC BLOOD PRESSURE: 58 MMHG | HEIGHT: 63 IN | TEMPERATURE: 98 F

## 2024-11-15 DIAGNOSIS — K05.10 BLISTER OF GINGIVA WITH INFECTION, INITIAL ENCOUNTER: Primary | ICD-10-CM

## 2024-11-15 DIAGNOSIS — S00.522A BLISTER OF GINGIVA WITH INFECTION, INITIAL ENCOUNTER: Primary | ICD-10-CM

## 2024-11-15 ASSESSMENT — ENCOUNTER SYMPTOMS
GASTROINTESTINAL NEGATIVE: 1
RESPIRATORY NEGATIVE: 1
ALLERGIC/IMMUNOLOGIC NEGATIVE: 1
EYES NEGATIVE: 1

## 2024-11-15 NOTE — PROGRESS NOTES
11/15/2024   Ana Hammond (: 2000) is a 24 y.o. female, New patient, here for evaluation of the following chief complaint(s):  Oral Pain (Sore on bottom gum. First noticed it on Monday. Dentist apt on Friday. )     ASSESSMENT/PLAN:  Below is the assessment and plan developed based on review of pertinent history, physical exam, labs, studies, and medications.  1. Blister of gingiva with infection, initial encounter  -     amoxicillin-clavulanate (AUGMENTIN) 875-125 MG per tablet; Take 1 tablet by mouth 2 times daily for 10 days, Disp-20 tablet, R-0Normal         Handout given with care instructions  2. OTC for symptom management. Increase fluid intake, ensure adequate nutritional intake.  3. Follow up with PCP as needed.  4. Go to ED with development of any acute symptoms.     Follow up:  Return if symptoms worsen or fail to improve.  Follow up immediately for any new, worsening or changes or if symptoms are not improving over the next 5-7 days.     SUBJECTIVE/OBJECTIVE:    Oral Pain   Pertinent negatives include no fever.        Diagnoses and all orders for this visit:  Blister of gingiva with infection, initial encounter  Oral Pain (Sore on bottom gum. First noticed it on Monday. Dentist apt on Friday. )    Ana Hammond is a 24 y.o. female who complains of erythema, irritation and sore on lower right gum for a few days . She denies a history of fevers .  Patient reports she was at the dentist office and got a dental scan and x-rays on Friday.  She also wears retainers at night patient also noted that OTC remedies did not help. PROGRESSION: STABLE SYMPTOMS  I have advised over-the-counter Orajel and canker sore cover to help with symptomatic management.  Advised to contact her dentist about current symptoms and to get reevaluated at the dentist office.  I will treat empirically with antibiotic for possible infection.    No results found for any visits on 11/15/24.    No results found for this visit

## 2024-12-10 ENCOUNTER — OFFICE VISIT (OUTPATIENT)
Age: 24
End: 2024-12-10

## 2024-12-10 VITALS
WEIGHT: 139 LBS | HEART RATE: 79 BPM | OXYGEN SATURATION: 98 % | SYSTOLIC BLOOD PRESSURE: 106 MMHG | BODY MASS INDEX: 24.63 KG/M2 | TEMPERATURE: 98.7 F | RESPIRATION RATE: 18 BRPM | HEIGHT: 63 IN | DIASTOLIC BLOOD PRESSURE: 71 MMHG

## 2024-12-10 DIAGNOSIS — J01.90 ACUTE BACTERIAL SINUSITIS: Primary | ICD-10-CM

## 2024-12-10 DIAGNOSIS — B96.89 ACUTE BACTERIAL SINUSITIS: Primary | ICD-10-CM

## 2024-12-10 DIAGNOSIS — R05.8 POST-VIRAL COUGH SYNDROME: ICD-10-CM

## 2024-12-10 RX ORDER — BENZONATATE 200 MG/1
200 CAPSULE ORAL 3 TIMES DAILY PRN
Qty: 21 CAPSULE | Refills: 0 | Status: SHIPPED | OUTPATIENT
Start: 2024-12-10 | End: 2024-12-17

## 2024-12-10 NOTE — PROGRESS NOTES
Ana Hammond (:  2000) is a 24 y.o. female,Established patient, here for evaluation of the following chief complaint(s):  Cold Symptoms (Cough/sneezing/congestion/HA x 1 week)      Assessment & Plan :  Visit Diagnoses and Associated Orders       Acute bacterial sinusitis    -  Primary    amoxicillin-clavulanate (AUGMENTIN) 875-125 MG per tablet [[id number]]           Post-viral cough syndrome        benzonatate (TESSALON) 200 MG capsule [[id number]]                    VSS, afebrile.  Sinus tenderness to percussion, symptoms present 10+ days. To treat for acute bacterial sinusitis with Augmentin.  To start benzonatate as directed for postviral cough.  May use nasal saline, cool mist humidifier, rest, increased fluid intake for symptomatic relief. To monitor symptoms and follow-up if symptoms worsen or do not improve on current treatment plan. To ED for severe CP, chest tightness, SOB, facial/periorbital swelling, change in mental status, rapid worsening of symptoms. Patient verbalized understanding, no questions or concerns at this time .  Follow up in PRN days if symptoms persist or if symptoms worsen.       Subjective :  HPI  HPI:   24 y.o. female presents with symptoms of productive cough, sneezing, rhinitis, nasal congestion, frontal HA, body aches, sinus pain, chest tightness, chest discomfort with coughing only x 1.5 weeks. Symptoms started with a sore throat, progressed to dry cough. Using tylenol, dayquil, theraflu with mild improvement in symptoms. Nothing makes symptoms worse. Denies sick contacts. Denies fever/chills/sweats, CP at rest, SOB, sore throat, ear pain, N/V/D, abdominal pain, swollen glands, rash.            Vitals:    12/10/24 1522   BP: 106/71   Site: Right Upper Arm   Position: Sitting   Cuff Size: Medium Adult   Pulse: 79   Resp: 18   Temp: 98.7 °F (37.1 °C)   SpO2: 98%   Weight: 63 kg (139 lb)   Height: 1.6 m (5' 3\")      No Known Allergies  Social History     Socioeconomic History

## 2024-12-19 ENCOUNTER — OFFICE VISIT (OUTPATIENT)
Age: 24
End: 2024-12-19
Payer: COMMERCIAL

## 2024-12-19 VITALS
SYSTOLIC BLOOD PRESSURE: 126 MMHG | HEIGHT: 63 IN | WEIGHT: 131 LBS | DIASTOLIC BLOOD PRESSURE: 77 MMHG | HEART RATE: 72 BPM | BODY MASS INDEX: 23.21 KG/M2

## 2024-12-19 DIAGNOSIS — Z01.419 WELL WOMAN EXAM: Primary | ICD-10-CM

## 2024-12-19 PROCEDURE — 99214 OFFICE O/P EST MOD 30 MIN: CPT | Performed by: OBSTETRICS & GYNECOLOGY

## 2024-12-19 RX ORDER — ONDANSETRON 4 MG/1
4 TABLET, FILM COATED ORAL 3 TIMES DAILY PRN
Qty: 15 TABLET | Refills: 0 | Status: SHIPPED | OUTPATIENT
Start: 2024-12-19

## 2024-12-19 RX ORDER — MISOPROSTOL 100 UG/1
200 TABLET ORAL ONCE
Qty: 1 TABLET | Refills: 0 | Status: SHIPPED | OUTPATIENT
Start: 2024-12-19 | End: 2024-12-19
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
## 2024-12-19 NOTE — PATIENT INSTRUCTIONS
can also help check your pelvic organs.  You may have a small amount of vaginal discharge or bleeding after the exam.  Why is a pelvic exam done?  A pelvic exam may be done:  To collect samples of cells for cervical cancer screening.  To check for vaginal infection.  To check for sexually transmitted infections, such as chlamydia or herpes.  To help find the cause of abnormal uterine bleeding.  To look for problems like uterine fibroids, ovarian cysts, or uterine prolapse.  To help find the cause of pelvic or belly pain.  Before inserting an intrauterine device (IUD).  To collect evidence if you've been sexually assaulted.  What are the risks of a pelvic exam?  There is a small chance that the doctor will find something on a pelvic exam that would not have caused a problem. This is called overdiagnosis. It could lead to tests or treatment you don't need.  When should you call for help?  Watch closely for changes in your health, and be sure to contact your doctor if you have any problems.  Where can you learn more?  Go to https://www.[employer].net/patientEd and enter M421 to learn more about \"Pelvic Exam: Care Instructions.\"  Current as of: November 27, 2023  Content Version: 14.2  © 2024 [employer].   Care instructions adapted under license by [employer]. If you have questions about a medical condition or this instruction, always ask your healthcare professional. [employer] disclaims any warranty or liability for your use of this information.

## 2025-01-24 ENCOUNTER — PROCEDURE VISIT (OUTPATIENT)
Age: 25
End: 2025-01-24
Payer: COMMERCIAL

## 2025-01-24 VITALS
OXYGEN SATURATION: 96 % | BODY MASS INDEX: 24.8 KG/M2 | DIASTOLIC BLOOD PRESSURE: 61 MMHG | HEIGHT: 63 IN | HEART RATE: 66 BPM | SYSTOLIC BLOOD PRESSURE: 100 MMHG | WEIGHT: 140 LBS

## 2025-01-24 DIAGNOSIS — N93.8 DYSFUNCTIONAL UTERINE BLEEDING: Primary | ICD-10-CM

## 2025-01-24 DIAGNOSIS — Z86.018 HISTORY OF DERMOID CYST EXCISION: ICD-10-CM

## 2025-01-24 DIAGNOSIS — Z98.890 HISTORY OF DERMOID CYST EXCISION: ICD-10-CM

## 2025-01-24 DIAGNOSIS — Z30.430 ENCOUNTER FOR IUD INSERTION: Primary | ICD-10-CM

## 2025-01-24 PROCEDURE — 99214 OFFICE O/P EST MOD 30 MIN: CPT | Performed by: OBSTETRICS & GYNECOLOGY

## 2025-01-24 SDOH — ECONOMIC STABILITY: FOOD INSECURITY: WITHIN THE PAST 12 MONTHS, YOU WORRIED THAT YOUR FOOD WOULD RUN OUT BEFORE YOU GOT MONEY TO BUY MORE.: NEVER TRUE

## 2025-01-24 SDOH — ECONOMIC STABILITY: FOOD INSECURITY: WITHIN THE PAST 12 MONTHS, THE FOOD YOU BOUGHT JUST DIDN'T LAST AND YOU DIDN'T HAVE MONEY TO GET MORE.: NEVER TRUE

## 2025-01-24 ASSESSMENT — PATIENT HEALTH QUESTIONNAIRE - PHQ9
SUM OF ALL RESPONSES TO PHQ9 QUESTIONS 1 & 2: 0
SUM OF ALL RESPONSES TO PHQ QUESTIONS 1-9: 0
2. FEELING DOWN, DEPRESSED OR HOPELESS: NOT AT ALL
SUM OF ALL RESPONSES TO PHQ QUESTIONS 1-9: 0
SUM OF ALL RESPONSES TO PHQ QUESTIONS 1-9: 0
1. LITTLE INTEREST OR PLEASURE IN DOING THINGS: NOT AT ALL
SUM OF ALL RESPONSES TO PHQ QUESTIONS 1-9: 0

## 2025-01-24 NOTE — PROGRESS NOTES
Ana Hammond is a 24 y.o. female presents for a problem visit. Ultrasound today to check cyst.            1. Have you been to the ER, urgent care clinic, or hospitalized since your last visit? No    2. Have you seen or consulted any other health care providers outside of the [facility] since your last visit? No     Madeline Schmitt LPN.
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
## 2025-01-24 NOTE — PROGRESS NOTES
Verbal order obtained from Kristy Myers MD for ultrasound and a diagnosis of iud insertion. Order read back to MD. Orders signed by this writer and sent for co-sign to MD.

## 2025-01-25 LAB
DHEA-S SERPL-MCNC: 164 UG/DL (ref 110–431.7)
HBA1C MFR BLD: 5.6 % (ref 4.8–5.6)
PROLACTIN SERPL-MCNC: 16.8 NG/ML (ref 4.8–33.4)
T4 FREE SERPL-MCNC: 1.23 NG/DL (ref 0.82–1.77)
TSH SERPL DL<=0.005 MIU/L-ACNC: 1.28 UIU/ML (ref 0.45–4.5)

## 2025-01-27 ENCOUNTER — TELEPHONE (OUTPATIENT)
Age: 25
End: 2025-01-27

## 2025-01-27 NOTE — TELEPHONE ENCOUNTER
Patient called in, name and  verified. Patient is calling as she was recently seen in the office last week and we were attempting to find out if she has PCOS. Pt reports that some of her blood work has come back and would like to see if the doctor can either confirm or deny a dx of PCOS.     She has an upcoming appt scheduled for Monday but would like to cancel it if the dx is clear. If not she will keep the appt.    Triage notes: pt can be reached @: [phone].

## 2025-01-28 LAB — 17OHP SERPL-MCNC: 40 NG/DL

## 2025-01-31 LAB
TESTOST FREE SERPL-MCNC: 1.6 PG/ML (ref 0–4.2)
TESTOST SERPL-MCNC: 37.4 NG/DL (ref 10–55)

## 2025-04-25 ENCOUNTER — TELEPHONE (OUTPATIENT)
Age: 25
End: 2025-04-25

## 2025-04-28 NOTE — TELEPHONE ENCOUNTER
All Conversations: Abdominal Cramping  (Newest Message First)             Deirdre Vora MD to Me      4/25/25  4:15 PM  Ok Lela - I just noticed she was also having dysuria - if this is the case she can come leave a urine sample so we can rule out UTI before she can get in for the US to assess the AUB.    Thanks!  Trinidad Gallo, RN to Me  Deirdre Vora MD  TB    4/25/25  4:07 PM  Schedule next available apt and US.  Not urgent per MD Vora    This nurse attempted to reach the patient and left a message for the patient to check her my chart and call the office Monday am    My chart message sent..  
Patient has read her my chart message.  
Patient was called back by this nurse and advised of MD recommendations and was placed on the nurse schedule for BJ tomorrow at 1:00 pm for urine specimen for culture.    Patient verbalized understanding.      
Two patient identifiers used      25 year old patient last seen in the office on 12/19/2024 for ae and then seen on 1/24/2025 for problem visit.      Patient states her follow up on 2/7/2025 was cancelled.      Patient is calling to report abdominal discomfort when she has brown spotting for 3-4 days, with light to moderate flow. During this time patient is also having pain with urination and some nause.      Patient has checked a urine pregnancy test and it was negative.    Patient reports she does not usually have bleeding but did the past month and it lasted for about 4-5 days and was heavy in flow.      Most recent ultrasound on 1/24/2025 showed a fibroid.    Patient is also under the impression she hs PCOS    Plan:   US findings discussed; consider fu US 6m  PCOS labs with Dr Diony rosario scheduled  Patient declines presence of chaperone during today's visit.    Please advise when patient can be seen for follow up and does she need ultrasound    Thank you  
PRINCIPAL DISCHARGE DIAGNOSIS  Diagnosis: Vaginal prolapse  Assessment and Plan of Treatment: Call MD for increase abdominal pain, nausea, vomiting, temperature >101.4F, or for difficulty voiding.  Follow up Dr. Quinones in one week.

## (undated) DEVICE — Device

## (undated) DEVICE — TROCAR: Brand: KII SLEEVE

## (undated) DEVICE — INSUFFLATION NEEDLE TO ESTABLISH PNEUMOPERITONEUM.: Brand: INSUFFLATION NEEDLE

## (undated) DEVICE — Z DUP USE 2431569 PATIENT POSITIONING KIT SURGYPAD

## (undated) DEVICE — GYN LAPAROSCOPY - SMH: Brand: MEDLINE INDUSTRIES, INC.

## (undated) DEVICE — NEEDLE HYPO 22GA L1.5IN BLK S STL HUB POLYPR SHLD REG BVL

## (undated) DEVICE — HANDLE LT SNAP ON ULT DURABLE LENS FOR TRUMPF ALC DISPOSABLE

## (undated) DEVICE — BLADE ASSEMB CLP HAIR FINE --

## (undated) DEVICE — CORDLESS ULTRASONIC DISSECTOR: Brand: SONICISION

## (undated) DEVICE — SCISSORS RMFG LAPAROSC 5MM -- LAWSON OEM ITEM 112765

## (undated) DEVICE — TISSUE RETRIEVAL SYSTEM: Brand: INZII RETRIEVAL SYSTEM

## (undated) DEVICE — SUTURE SZ 0 27IN 5/8 CIR UR-6  TAPER PT VIOLET ABSRB VICRYL J603H

## (undated) DEVICE — SUTURE VCRL SZ 2-0 L27IN ABSRB UD L26MM SH 1/2 CIR J417H

## (undated) DEVICE — SYSTEM EVAC SMOKE LAPARSCOPIC

## (undated) DEVICE — GLOVE SURG SZ 6 THK91MIL LTX FREE SYN POLYISOPRENE ANTI

## (undated) DEVICE — GLOVE SURG SZ 6 L12IN FNGR THK79MIL GRN LTX FREE

## (undated) DEVICE — SUTURE MCRYL SZ 4-0 L27IN ABSRB UD L19MM PS-2 1/2 CIR PRIM Y426H

## (undated) DEVICE — TROCAR: Brand: KII OPTICAL ACCESS SYSTEM

## (undated) DEVICE — TROCAR: Brand: KII FIOS FIRST ENTRY